# Patient Record
Sex: FEMALE | Race: OTHER
[De-identification: names, ages, dates, MRNs, and addresses within clinical notes are randomized per-mention and may not be internally consistent; named-entity substitution may affect disease eponyms.]

---

## 2017-09-18 PROBLEM — Z00.00 ENCOUNTER FOR PREVENTIVE HEALTH EXAMINATION: Status: ACTIVE | Noted: 2017-09-18

## 2017-10-03 ENCOUNTER — APPOINTMENT (OUTPATIENT)
Dept: UROLOGY | Facility: CLINIC | Age: 62
End: 2017-10-03
Payer: MEDICAID

## 2017-10-03 VITALS
BODY MASS INDEX: 26.06 KG/M2 | HEART RATE: 69 BPM | DIASTOLIC BLOOD PRESSURE: 65 MMHG | SYSTOLIC BLOOD PRESSURE: 123 MMHG | WEIGHT: 138 LBS | HEIGHT: 61 IN

## 2017-10-03 DIAGNOSIS — E78.00 PURE HYPERCHOLESTEROLEMIA, UNSPECIFIED: ICD-10-CM

## 2017-10-03 DIAGNOSIS — Z63.4 DISAPPEARANCE AND DEATH OF FAMILY MEMBER: ICD-10-CM

## 2017-10-03 DIAGNOSIS — Z82.0 FAMILY HISTORY OF EPILEPSY AND OTHER DISEASES OF THE NERVOUS SYSTEM: ICD-10-CM

## 2017-10-03 DIAGNOSIS — I10 ESSENTIAL (PRIMARY) HYPERTENSION: ICD-10-CM

## 2017-10-03 DIAGNOSIS — Z82.49 FAMILY HISTORY OF ISCHEMIC HEART DISEASE AND OTHER DISEASES OF THE CIRCULATORY SYSTEM: ICD-10-CM

## 2017-10-03 PROCEDURE — 99213 OFFICE O/P EST LOW 20 MIN: CPT

## 2017-10-03 RX ORDER — SIMVASTATIN 20 MG/1
20 TABLET, FILM COATED ORAL
Qty: 30 | Refills: 0 | Status: ACTIVE | COMMUNITY
Start: 2017-08-22

## 2017-10-03 RX ORDER — AMLODIPINE BESYLATE 10 MG/1
10 TABLET ORAL
Qty: 30 | Refills: 0 | Status: ACTIVE | COMMUNITY
Start: 2017-08-23

## 2017-10-03 RX ORDER — POLYETHYLENE GLYCOL-3350 AND ELECTROLYTES 236; 6.74; 5.86; 2.97; 22.74 G/274.31G; G/274.31G; G/274.31G; G/274.31G; G/274.31G
236 POWDER, FOR SOLUTION ORAL
Qty: 4000 | Refills: 0 | Status: ACTIVE | COMMUNITY
Start: 2017-06-04

## 2017-10-03 SDOH — SOCIAL STABILITY - SOCIAL INSECURITY: DISSAPEARANCE AND DEATH OF FAMILY MEMBER: Z63.4

## 2017-10-09 ENCOUNTER — RESULT REVIEW (OUTPATIENT)
Age: 62
End: 2017-10-09

## 2018-01-17 ENCOUNTER — APPOINTMENT (OUTPATIENT)
Dept: UROLOGY | Facility: CLINIC | Age: 63
End: 2018-01-17
Payer: MEDICAID

## 2018-01-17 VITALS
HEART RATE: 72 BPM | SYSTOLIC BLOOD PRESSURE: 135 MMHG | WEIGHT: 138 LBS | DIASTOLIC BLOOD PRESSURE: 73 MMHG | BODY MASS INDEX: 26.06 KG/M2 | HEIGHT: 61 IN

## 2018-01-17 DIAGNOSIS — N28.1 CYST OF KIDNEY, ACQUIRED: ICD-10-CM

## 2018-01-17 PROCEDURE — 99213 OFFICE O/P EST LOW 20 MIN: CPT

## 2018-01-31 ENCOUNTER — APPOINTMENT (OUTPATIENT)
Dept: VASCULAR SURGERY | Facility: CLINIC | Age: 63
End: 2018-01-31
Payer: MEDICAID

## 2018-01-31 VITALS
DIASTOLIC BLOOD PRESSURE: 78 MMHG | SYSTOLIC BLOOD PRESSURE: 122 MMHG | BODY MASS INDEX: 27.19 KG/M2 | HEIGHT: 61 IN | WEIGHT: 144 LBS

## 2018-01-31 DIAGNOSIS — Z86.718 PERSONAL HISTORY OF OTHER VENOUS THROMBOSIS AND EMBOLISM: ICD-10-CM

## 2018-01-31 PROCEDURE — 99203 OFFICE O/P NEW LOW 30 MIN: CPT

## 2018-01-31 PROCEDURE — 93970 EXTREMITY STUDY: CPT

## 2018-07-25 ENCOUNTER — APPOINTMENT (OUTPATIENT)
Dept: UROLOGY | Facility: CLINIC | Age: 63
End: 2018-07-25
Payer: MEDICAID

## 2018-07-25 VITALS
WEIGHT: 144 LBS | HEIGHT: 61 IN | DIASTOLIC BLOOD PRESSURE: 72 MMHG | BODY MASS INDEX: 27.19 KG/M2 | HEART RATE: 69 BPM | SYSTOLIC BLOOD PRESSURE: 143 MMHG

## 2018-07-25 DIAGNOSIS — N28.1 CYST OF KIDNEY, ACQUIRED: ICD-10-CM

## 2018-07-25 PROCEDURE — 99213 OFFICE O/P EST LOW 20 MIN: CPT

## 2018-10-23 ENCOUNTER — RESULT REVIEW (OUTPATIENT)
Age: 63
End: 2018-10-23

## 2020-10-05 ENCOUNTER — RESULT REVIEW (OUTPATIENT)
Age: 65
End: 2020-10-05

## 2021-07-29 ENCOUNTER — RESULT REVIEW (OUTPATIENT)
Age: 66
End: 2021-07-29

## 2022-04-25 ENCOUNTER — EMERGENCY (EMERGENCY)
Facility: HOSPITAL | Age: 67
LOS: 0 days | Discharge: HOME | End: 2022-04-25
Attending: EMERGENCY MEDICINE | Admitting: EMERGENCY MEDICINE
Payer: MEDICAID

## 2022-04-25 VITALS
OXYGEN SATURATION: 100 % | HEART RATE: 82 BPM | WEIGHT: 128.09 LBS | DIASTOLIC BLOOD PRESSURE: 66 MMHG | TEMPERATURE: 98 F | SYSTOLIC BLOOD PRESSURE: 135 MMHG | RESPIRATION RATE: 18 BRPM

## 2022-04-25 DIAGNOSIS — M79.671 PAIN IN RIGHT FOOT: ICD-10-CM

## 2022-04-25 DIAGNOSIS — I10 ESSENTIAL (PRIMARY) HYPERTENSION: ICD-10-CM

## 2022-04-25 DIAGNOSIS — M79.661 PAIN IN RIGHT LOWER LEG: ICD-10-CM

## 2022-04-25 DIAGNOSIS — Z86.2 PERSONAL HISTORY OF DISEASES OF THE BLOOD AND BLOOD-FORMING ORGANS AND CERTAIN DISORDERS INVOLVING THE IMMUNE MECHANISM: ICD-10-CM

## 2022-04-25 DIAGNOSIS — E11.9 TYPE 2 DIABETES MELLITUS WITHOUT COMPLICATIONS: ICD-10-CM

## 2022-04-25 DIAGNOSIS — M79.651 PAIN IN RIGHT THIGH: ICD-10-CM

## 2022-04-25 DIAGNOSIS — E78.5 HYPERLIPIDEMIA, UNSPECIFIED: ICD-10-CM

## 2022-04-25 DIAGNOSIS — Z86.718 PERSONAL HISTORY OF OTHER VENOUS THROMBOSIS AND EMBOLISM: ICD-10-CM

## 2022-04-25 DIAGNOSIS — M25.551 PAIN IN RIGHT HIP: ICD-10-CM

## 2022-04-25 PROCEDURE — 73502 X-RAY EXAM HIP UNI 2-3 VIEWS: CPT | Mod: 26,RT

## 2022-04-25 PROCEDURE — 73552 X-RAY EXAM OF FEMUR 2/>: CPT | Mod: 26,RT

## 2022-04-25 PROCEDURE — 93971 EXTREMITY STUDY: CPT | Mod: 26,RT

## 2022-04-25 PROCEDURE — 99285 EMERGENCY DEPT VISIT HI MDM: CPT

## 2022-04-25 RX ORDER — METHOCARBAMOL 500 MG/1
1000 TABLET, FILM COATED ORAL ONCE
Refills: 0 | Status: COMPLETED | OUTPATIENT
Start: 2022-04-25 | End: 2022-04-25

## 2022-04-25 RX ORDER — METHOCARBAMOL 500 MG/1
2 TABLET, FILM COATED ORAL
Qty: 30 | Refills: 0
Start: 2022-04-25 | End: 2022-04-29

## 2022-04-25 RX ADMIN — METHOCARBAMOL 1000 MILLIGRAM(S): 500 TABLET, FILM COATED ORAL at 21:21

## 2022-04-25 NOTE — ED PROVIDER NOTE - CLINICAL SUMMARY MEDICAL DECISION MAKING FREE TEXT BOX
Patient presented with atraumatic R hip and femur region pain x 3 days as described above. Otherwise afebrile, HD stable, neurovascularly intact, compartments soft, no evidence of infection on exam. DVT study obtained and negative. Xrays negative as well for acute fx/dislocation. Patient felt much better after tx in ED. Able to ambulate. Given the above, will discharge home with outpatient follow up. Patient agreeable with plan. Agrees to return to ED for any new or worsening symptoms.

## 2022-04-25 NOTE — ED ADULT TRIAGE NOTE - CHIEF COMPLAINT QUOTE
Pt c/o 3 days of right leg pain that started from the foot and hurts all the way to her hip. Has hx of DVT. No swelling noted

## 2022-04-25 NOTE — ED PROVIDER NOTE - NS ED ROS FT
Constitutional: No fever, chills.  Eyes:  No visual changes  ENMT:  No neck pain  Cardiac:  No chest pain  Respiratory:  No cough, SOB  GI:  No nausea, vomiting  :  No dysuria, hematuria  MS:  (+)RLE pain. No back pain.  Neuro:  No numbness/tingling or weakness  Skin:  No skin rash    Except as documented in the HPI,  all other systems are negative.

## 2022-04-25 NOTE — ED PROVIDER NOTE - PHYSICAL EXAMINATION
GENERAL: Well-nourished, Well-developed. NAD.  HEAD: No visible or palpable bumps or hematomas. No ecchymosis behind ears B/L.  Eyes: PERRLA, EOMI. No asymmetry. No nystagmus. No conjunctival injection. Non-icteric sclera.  ENMT: MMM.   Neck: Supple. FROM  CVS: RRR. Normal S1,S2. No murmurs appreciated on auscultation   RESP: No use of accessory muscles. Chest rise symmetrical with good expansion. Lungs clear to auscultation B/L. No wheezing, rales, or rhonchi auscultated.  MSK: Extremities w/o deformity or ttp. No visible signs of trauma such as ecchymosis, erythema, or swelling. FROM of lower extremities B/L. No midline spinal TTP. FROM of back with flexion and extension.  Skin: Warm, Dry. No rashes or lesions. Good cap refill < 2 sec B/L.  EXT: Radial and pedal pulses present B/L. No calf tenderness or swelling B/L. No palpable cords. No pedal edema B/L.  Neuro: AA&O x 3. Sensation grossly intact. Strength 5/5 B/L. Gait within normal limits.

## 2022-04-25 NOTE — ED PROVIDER NOTE - PATIENT PORTAL LINK FT
You can access the FollowMyHealth Patient Portal offered by [facility] by registering at the following website: [email or web address]. By joining [email or web address]’s FollowMyHealth portal, you will also be able to view your health information using other applications (apps) compatible with our system.

## 2022-04-25 NOTE — ED PROVIDER NOTE - OBJECTIVE STATEMENT
68 yo F pmhx factor V Leiden deficiency, dvt 38 years ago (not on anticoags), osteoporosis, htn, hld, dm presenting to the ED for evaluation of RLE pain x 3 days. Pt reports pain began in R foot and has now radiated up to R hip region, worse with standing long periods and movement. Denies any direct trauma. Denies fever, chills, numbness/tingling, weakness, sob, chest pain.

## 2022-04-25 NOTE — ED PROVIDER NOTE - CARE PROVIDER_API CALL
Adolfo Lew)  Orthopaedic Surgery  [address] [place] [address]  Phone: [phone]  Fax: [phone]  Follow Up Time: 4-6 Days

## 2022-04-25 NOTE — ED PROVIDER NOTE - NSFOLLOWUPINSTRUCTIONS_ED_ALL_ED_FT
Leg Pain    WHAT YOU NEED TO KNOW:    Leg pain may be caused by a variety of health conditions. Your tests did not show any broken bones or blood clots.    DISCHARGE INSTRUCTIONS:    Return to the emergency department if:     You have a fever.      Your leg starts to swell.      Your leg pain gets worse.      You have numbness or tingling in your leg or toes.      You cannot put any weight on or move your leg.    Contact your healthcare provider if:     Your pain does not decrease, even after treatment.      You have questions or concerns about your condition or care.    Medicines:     NSAIDs, such as ibuprofen, help decrease swelling, pain, and fever. This medicine is available with or without a doctor's order. NSAIDs can cause stomach bleeding or kidney problems in certain people. If you take blood thinner medicine, always ask your healthcare provider if NSAIDs are safe for you. Always read the medicine label and follow directions.      Take your medicine as directed. Contact your healthcare provider if you think your medicine is not helping or if you have side effects. Tell him of her if you are allergic to any medicine. Keep a list of the medicines, vitamins, and herbs you take. Include the amounts, and when and why you take them. Bring the list or the pill bottles to follow-up visits. Carry your medicine list with you in case of an emergency.    Follow up with your healthcare provider as directed: You may need more tests to find the cause of your leg pain. You may need to see an orthopedic specialist or a physical therapist. Write down your questions so you remember to ask them during your visits.    Manage your leg pain:     Rest your injured leg so that it can heal. You may need an immobilizer, brace, or splint to limit the movement of your leg. You may need to avoid putting any weight on your leg for at least 48 hours. Return to normal activities as directed.      Ice the injury for 20 minutes every 4 hours for up to 24 hours, or as directed. Use an ice pack, or put crushed ice in a plastic bag. Cover it with a towel to protect your skin. Ice helps prevent tissue damage and decreases swelling and pain.      Elevate your injured leg above the level of your heart as often as you can. This will help decrease swelling and pain. If possible, prop your leg on pillows or blankets to keep the area elevated comfortably.       Use assistive devices as directed. You may need to use a cane or crutches. Assistive devices help decrease pain and pressure on your leg when you walk. Ask your healthcare provider for more information about assistive devices and how to use them correctly.      Maintain a healthy weight. Extra body weight can cause pressure and pain in your hip, knee, and ankle joints. Ask your healthcare provider how much you should weigh. Ask him to help you create a weight loss plan if you are overweight.         © Copyright [employer] 2019 All illustrations and images included in CareNotes are the copyrighted property of [employer] or [employer].

## 2022-04-25 NOTE — ED PROVIDER NOTE - NSICDXPASTMEDICALHX_GEN_ALL_CORE_FT
PAST MEDICAL HISTORY:  Diabetes mellitus     DVT, lower extremity     H/O factor V Leiden mutation     Hyperlipidemia     Hypertension

## 2023-10-30 PROBLEM — I82.409 ACUTE EMBOLISM AND THROMBOSIS OF UNSPECIFIED DEEP VEINS OF UNSPECIFIED LOWER EXTREMITY: Chronic | Status: ACTIVE | Noted: 2022-05-02

## 2023-10-30 PROBLEM — I10 ESSENTIAL (PRIMARY) HYPERTENSION: Chronic | Status: ACTIVE | Noted: 2022-05-02

## 2023-10-30 PROBLEM — E11.9 TYPE 2 DIABETES MELLITUS WITHOUT COMPLICATIONS: Chronic | Status: ACTIVE | Noted: 2022-05-02

## 2023-10-30 PROBLEM — E78.5 HYPERLIPIDEMIA, UNSPECIFIED: Chronic | Status: ACTIVE | Noted: 2022-05-02

## 2023-10-30 PROBLEM — Z86.2 PERSONAL HISTORY OF DISEASES OF THE BLOOD AND BLOOD-FORMING ORGANS AND CERTAIN DISORDERS INVOLVING THE IMMUNE MECHANISM: Chronic | Status: ACTIVE | Noted: 2022-05-02

## 2023-11-07 ENCOUNTER — OUTPATIENT (OUTPATIENT)
Dept: OUTPATIENT SERVICES | Facility: HOSPITAL | Age: 68
LOS: 1 days | End: 2023-11-07
Payer: MEDICAID

## 2023-11-07 DIAGNOSIS — R94.02 ABNORMAL BRAIN SCAN: ICD-10-CM

## 2023-11-07 DIAGNOSIS — Z00.8 ENCOUNTER FOR OTHER GENERAL EXAMINATION: ICD-10-CM

## 2023-11-07 PROCEDURE — A9521: CPT

## 2023-11-07 PROCEDURE — 78803 RP LOCLZJ TUM SPECT 1 AREA: CPT | Mod: 26

## 2023-11-07 PROCEDURE — 78803 RP LOCLZJ TUM SPECT 1 AREA: CPT

## 2023-11-08 DIAGNOSIS — R94.02 ABNORMAL BRAIN SCAN: ICD-10-CM
